# Patient Record
Sex: FEMALE | Race: WHITE
[De-identification: names, ages, dates, MRNs, and addresses within clinical notes are randomized per-mention and may not be internally consistent; named-entity substitution may affect disease eponyms.]

---

## 2018-07-24 ENCOUNTER — HOSPITAL ENCOUNTER (INPATIENT)
Dept: HOSPITAL 80 - FED | Age: 68
LOS: 9 days | Discharge: NURSING, REHAB OR LONG-TERM CARE | DRG: 71 | End: 2018-08-02
Attending: INTERNAL MEDICINE | Admitting: INTERNAL MEDICINE
Payer: COMMERCIAL

## 2018-07-24 DIAGNOSIS — I10: ICD-10-CM

## 2018-07-24 DIAGNOSIS — M25.512: ICD-10-CM

## 2018-07-24 DIAGNOSIS — R73.9: ICD-10-CM

## 2018-07-24 DIAGNOSIS — F17.200: ICD-10-CM

## 2018-07-24 DIAGNOSIS — I69.120: ICD-10-CM

## 2018-07-24 DIAGNOSIS — G93.49: Primary | ICD-10-CM

## 2018-07-24 DIAGNOSIS — Z23: ICD-10-CM

## 2018-07-24 DIAGNOSIS — I69.154: ICD-10-CM

## 2018-07-24 DIAGNOSIS — R45.1: ICD-10-CM

## 2018-07-24 DIAGNOSIS — I69.191: ICD-10-CM

## 2018-07-24 LAB — PLATELET # BLD: 396 10^3/UL (ref 150–400)

## 2018-07-24 PROCEDURE — G0480 DRUG TEST DEF 1-7 CLASSES: HCPCS

## 2018-07-24 PROCEDURE — G0009 ADMIN PNEUMOCOCCAL VACCINE: HCPCS

## 2018-07-24 NOTE — EDPHY
H & P


Stated Complaint: Chest pain, altered mental status


Time Seen by Provider: 07/24/18 19:40


HPI/ROS: 





CHIEF COMPLAINT:  Chest pain





Limitations:  Expressive aphasia, history per family and Dr. Johnson





HISTORY OF PRESENT ILLNESS:  68-year-old female status post recent hemorrhagic 

CVA presents with chest pain.  Onset of SOB and left sided cp this afternoon.  

Associated with nausea.  The patient is unable to provide any details about the 

chest pain.  She currently does not have chest pain.  Nitroglycerin given by 

EMS.  Per Dr. Johnson, pt's cognition has changed in the past 3 days.  Unclear 

etiology of change in mentation, but possibly secondary to baclofen.  She was 

on Wellbutrin prior to the CVA and then switched to Celebrex.  Celebrex was 

discontinued d/t cognitive change.  CT scan brain today showed no new changes.  

Per family, pt is normally quite sociable and agreeable.  Fairly abrupt change 

in the past 3-4 days.





REVIEW OF SYSTEMS:  Unable to determine








- Personal History


Current Tetanus Diphtheria and Acellular Pertussis (TDAP): Yes





- Medical/Surgical History


Hx Asthma: No


Hx Chronic Respiratory Disease: No


Hx Diabetes: No


Hx Cardiac Disease: No


Hx Renal Disease: No


Hx Cirrhosis: No


Hx Alcoholism: No


Hx HIV/AIDS: No


Hx Splenectomy or Spleen Trauma: No


Other PMH: cataract surgery, CVA June 2018





- Social History


Smoking Status: Former smoker


Additional Social History: 














- Physical Exam


Exam: 





General Appearance:  Alert, pleasant, expressive aphasia


Eyes:  Pupils equal and round, no conjunctival pallor or injection


ENT, Mouth:  Mucous membranes moist


Neck:  Normal inspection


Respiratory:  Lungs are clear to auscultation


Cardiovascular:  Regular rate and rhythm


Gastrointestinal:  Abdomen is soft and nontender


Neurological:  Alert, left sided hemiparesis and neglect


Skin:  Warm and dry


Extremities:  No tenderness


Psychiatric:  Tangential thought process, fluctuating affect





Constitutional: 


 Initial Vital Signs











Temperature (C)  36.9 C   07/24/18 19:43


 


Heart Rate  108 H  07/24/18 19:43


 


Respiratory Rate  20   07/24/18 19:43


 


Blood Pressure  133/85 H  07/24/18 19:43


 


O2 Sat (%)  92   07/24/18 19:43








 











O2 Delivery Mode               Nasal Cannula


 


O2 (L/minute)                  2














Allergies/Adverse Reactions: 


 





prednisone Allergy (Verified 07/24/18 19:43)


 








Home Medications: 














 Medication  Instructions  Recorded


 


Cholecalciferol Vit D3 [Vitamin D3 1,000 units PO DAILY 07/05/18





(*)]  


 


Enoxaparin [Lovenox 40 MG (*)] 40 mg SQ DAILY 07/05/18


 


Metoprolol Tartrate [Lopressor 50 50 mg PO BID 07/05/18





mg (*)]  


 


Pantoprazole Sodium [Protonix 40mg 40 mg PO DAILY 07/05/18





(*)]  


 


amLODIPine BESYLATE [Norvasc 5 mg 5 mg PO DAILY 07/05/18





(*)]  


 


Acetaminophen [Tylenol  mg 1,000 mg PO Q8 07/24/18





(*)]  


 


Baclofen [Baclofen 10 mg (*)] 5 mg PO QID 07/24/18


 


Bisacodyl [Dulcolax] 10 mg RC DAILY PRN 07/24/18


 


Cetirizine [ZyrTEC 10 mg (*)] 10 mg PO DAILY 07/24/18


 


Citalopram [CeleXA] 20 mg PO DAILY 07/24/18


 


Methyl Salicylate/Menthol [Bengay 1 chio TP Q6 PRN 07/24/18





(*)]  


 


Mineral Oil/Pet Hy-Phl [Aquaphor 1 chio TP BID 07/24/18





Ointment (*)]  


 


Sennosides [Senokot 8.6mg (OTC)] 1 each PO BID PRN 07/24/18


 


Tears/Dextran 70/Hypromellose 1 drop EACHEYE Q2 PRN 07/24/18





[Natural Balance Tears (*)]  


 


diphenhydrAMINE [Benadryl Cream] 1 chio TP QID PRN 07/24/18


 


traMADol [Ultram 50 mg (*)] 50 mg PO Q6 PRN 07/24/18


 


traZODone [traZODONE 50MG (*)] 50 mg PO HS 07/24/18














Medical Decision Making





- Diagnostics


EKG Interpretation: 





EKG interpreted by me reveals normal sinus rhythm, rate 98, no ST or T segment 

changes.  Interpretation:  Normal EKG


Imaging Results: 


 Imaging Impressions





Chest X-Ray  07/24/18 19:47


Impression: Peribronchial cuffing may be manifestation of interstitial edema or 

bronchitis.  








Chest/Thorax CTA  07/24/18 21:00


Impression:


1.  Nondiagnostic study for pulmonary embolism due to transient interruption of 

the contrast column (due to the patient talking throughout the acquisition).


2.  Normal caliber thoracic aorta.  No dissection.


3.  Clear lungs.


 


Comment:  The case was discussed with Dr. Torie Pollock.  Dr. Pollock indicated the 

patient should be transferred back to the Emergency Department for further 

assessment.


 


E:amm








CXR: NAD





ED Course/Re-evaluation: 





This pt presents with cp and SOB, asymptomatic now.  EKG reveals no evidence of 

ischemia or dysrhythmia.  Chest x-ray is unremarkable.  ddimer slightly high; 

CTA chest ordered to r/o PE, though poor study, unable to visualize pulm 

vasculature.  Will hold off on repeat CT for now, keep pt on Lovenox.  

Consulted with Dr. Johnson, plan for admission, eval of cp and AMS.  New AMS, 

unclear etiology, requests MRI while hospitalized.  The hospitalist service was 

consulted for admission.  





Differential Diagnosis: 





Differential diagnosis includes though it is not limited to pneumonia, 

pneumothorax, pulmonary embolism, aortic dissection, pericarditis, acute 

coronary syndrome.





- Data Points


Laboratory Results: 


 Laboratory Results





 07/24/18 20:16 





 07/24/18 20:16 





 











  07/24/18 07/24/18 07/24/18





  20:20 20:16 20:16


 


WBC      





    


 


RBC      





    


 


Hgb      





    


 


Hct      





    


 


MCV      





    


 


MCH      





    


 


MCHC      





    


 


RDW      





    


 


Plt Count      





    


 


MPV      





    


 


Neut % (Auto)      





    


 


Lymph % (Auto)      





    


 


Mono % (Auto)      





    


 


Eos % (Auto)      





    


 


Baso % (Auto)      





    


 


Nucleat RBC Rel Count      





    


 


Absolute Neuts (auto)      





    


 


Absolute Lymphs (auto)      





    


 


Absolute Monos (auto)      





    


 


Absolute Eos (auto)      





    


 


Absolute Basos (auto)      





    


 


Absolute Nucleated RBC      





    


 


Immature Gran %      





    


 


Immature Gran #      





    


 


D-Dimer      0.68 ug/mLFEU H ug/mLFEU





     (0.00-0.50) 


 


Sodium    136 mEq/L mEq/L  





    (135-145)  


 


Potassium    4.0 mEq/L mEq/L  





    (3.3-5.0)  


 


Chloride    103 mEq/L mEq/L  





    ()  


 


Carbon Dioxide    24 mEq/l mEq/l  





    (22-31)  


 


Anion Gap    9 mEq/L mEq/L  





    (8-16)  


 


BUN    10 mg/dL mg/dL  





    (7-23)  


 


Creatinine    0.6 mg/dL mg/dL  





    (0.6-1.0)  


 


Estimated GFR    > 60   





    


 


Glucose    250 mg/dL H mg/dL  





    ()  


 


Calcium    9.9 mg/dL mg/dL  





    (8.5-10.4)  


 


Total Bilirubin    0.2 mg/dL mg/dL  





    (0.1-1.4)  


 


Conjugated Bilirubin    0.2 mg/dL mg/dL  





    (0.0-0.5)  


 


Unconjugated Bilirubin    0.0 mg/dL mg/dL  





    (0.0-1.1)  


 


AST    42 IU/L IU/L  





    (14-46)  


 


ALT    67 IU/L H IU/L  





    (9-52)  


 


Alkaline Phosphatase    91 IU/L IU/L  





    ()  


 


POC Troponin I  0.00 ng/mL ng/mL    





   (0.00-0.08)   


 


NT-Pro-B Natriuret Pep    205 pg/mL H pg/mL  





    (0-125)  


 


Total Protein    6.2 g/dL L g/dL  





    (6.3-8.2)  


 


Albumin    3.7 g/dL g/dL  





    (3.5-5.0)  


 


Urine Color      





    


 


Urine Appearance      





    


 


Urine pH      





    


 


Ur Specific Gravity      





    


 


Urine Protein      





    


 


Urine Ketones      





    


 


Urine Blood      





    


 


Urine Nitrate      





    


 


Urine Bilirubin      





    


 


Urine Urobilinogen      





    


 


Ur Leukocyte Esterase      





    


 


Urine RBC      





    


 


Urine WBC      





    


 


Ur Epithelial Cells      





    


 


Urine Bacteria      





    


 


Urine Glucose      





    


 


Ethyl Alcohol    < 10 mg/dL mg/dL  





    (0-10)  














  07/24/18 07/24/18





  20:16 20:03


 


WBC  7.83 10^3/uL 10^3/uL  





   (3.80-9.50)  


 


RBC  5.13 10^6/uL 10^6/uL  





   (4.18-5.33)  


 


Hgb  15.2 g/dL g/dL  





   (12.6-16.3)  


 


Hct  45.2 % %  





   (38.0-47.0)  


 


MCV  88.1 fL fL  





   (81.5-99.8)  


 


MCH  29.6 pg pg  





   (27.9-34.1)  


 


MCHC  33.6 g/dL g/dL  





   (32.4-36.7)  


 


RDW  13.9 % %  





   (11.5-15.2)  


 


Plt Count  396 10^3/uL 10^3/uL  





   (150-400)  


 


MPV  10.1 fL fL  





   (8.7-11.7)  


 


Neut % (Auto)  65.5 % %  





   (39.3-74.2)  


 


Lymph % (Auto)  23.1 % %  





   (15.0-45.0)  


 


Mono % (Auto)  8.8 % %  





   (4.5-13.0)  


 


Eos % (Auto)  1.7 % %  





   (0.6-7.6)  


 


Baso % (Auto)  0.4 % %  





   (0.3-1.7)  


 


Nucleat RBC Rel Count  0.0 % %  





   (0.0-0.2)  


 


Absolute Neuts (auto)  5.13 10^3/uL 10^3/uL  





   (1.70-6.50)  


 


Absolute Lymphs (auto)  1.81 10^3/uL 10^3/uL  





   (1.00-3.00)  


 


Absolute Monos (auto)  0.69 10^3/uL 10^3/uL  





   (0.30-0.80)  


 


Absolute Eos (auto)  0.13 10^3/uL 10^3/uL  





   (0.03-0.40)  


 


Absolute Basos (auto)  0.03 10^3/uL 10^3/uL  





   (0.02-0.10)  


 


Absolute Nucleated RBC  0.00 10^3/uL 10^3/uL  





   (0-0.01)  


 


Immature Gran %  0.5 % %  





   (0.0-1.1)  


 


Immature Gran #  0.04 10^3/uL 10^3/uL  





   (0.00-0.10)  


 


D-Dimer    





   


 


Sodium    





   


 


Potassium    





   


 


Chloride    





   


 


Carbon Dioxide    





   


 


Anion Gap    





   


 


BUN    





   


 


Creatinine    





   


 


Estimated GFR    





   


 


Glucose    





   


 


Calcium    





   


 


Total Bilirubin    





   


 


Conjugated Bilirubin    





   


 


Unconjugated Bilirubin    





   


 


AST    





   


 


ALT    





   


 


Alkaline Phosphatase    





   


 


POC Troponin I    





   


 


NT-Pro-B Natriuret Pep    





   


 


Total Protein    





   


 


Albumin    





   


 


Urine Color    YELLOW 





   


 


Urine Appearance    CLEAR 





   


 


Urine pH    6.0 





    (5.0-7.5) 


 


Ur Specific Gravity    1.008 





    (1.002-1.030) 


 


Urine Protein    NEGATIVE 





    (NEGATIVE) 


 


Urine Ketones    NEGATIVE 





    (NEGATIVE) 


 


Urine Blood    NEGATIVE 





    (NEGATIVE) 


 


Urine Nitrate    NEGATIVE 





    (NEGATIVE) 


 


Urine Bilirubin    NEGATIVE 





    (NEGATIVE) 


 


Urine Urobilinogen    NEGATIVE EU EU





    (0.2-1.0) 


 


Ur Leukocyte Esterase    NEGATIVE 





    (NEGATIVE) 


 


Urine RBC    NONE SEEN /hpf /hpf





    (0-3) 


 


Urine WBC    1-3 /hpf /hpf





    (0-3) 


 


Ur Epithelial Cells    TRACE /lpf /lpf





    (NONE-1+) 


 


Urine Bacteria    TRACE /hpf H /hpf





    (NONE SEEN) 


 


Urine Glucose    3+  H 





    (NEGATIVE) 


 


Ethyl Alcohol    





   











Point of Care Test Results: 


 Chemistry











  07/24/18





  20:20


 


POC Troponin I  0.00 ng/mL ng/mL





   (0.00-0.08) 














Departure





- Departure

## 2018-07-24 NOTE — CPEKG
Heart Rate: 98

RR Interval: 612

P-R Interval: 132

QRSD Interval: 92

QT Interval: 364

QTC Interval: 465

P Axis: 46

QRS Axis: 61

T Wave Axis: 28

EKG Severity - NORMAL ECG -

EKG Impression: SINUS RHYTHM

Electronically Signed By: Adán Ma 25-Jul-2018 08:04:30

## 2018-07-24 NOTE — GHP
[f 
rep st]



                                                            HISTORY AND PHYSICAL





DATE OF ADMISSION:  07/24/2018



CHIEF COMPLAINT:  Chest pain, confusion.



HISTORY OF PRESENT ILLNESS:  This is obtained from Dr. Vinson in the ER and 
review of rehab notes, as patient is encephalopathic.



HISTORY OF PRESENT ILLNESS:  A 68-year-old female who presented to [facility] 06/18 with left upper and lower extremity weakness and was 
found to have a right frontal intraparenchymal hemorrhagic stroke.  She 
underwent craniotomy 06/19.  She was admitted to rehab at [facility] on 07/5 under 
the care of Dr. Valdez with cognitive impairment, left hemiparesis. Brought to 
ER for left-sided chest pain with shortness of breath and nausea this 
afternoon.  No radiation, numbness, or tingling. She is not good historian with 
acute AMS.



Per Dr. Fleming at rehab, her mental status has changed over the past 3 days.  
She is more confused and not participating in therapies. CTH today was stable, 
but MRI not complete, because of agitation. Of note, Baclofen had been 
increased to 15mg QID yesterday. Was on Wellbutrin before stroke and this was 
changed to Celexa at rehab. Both Baclofen and Celexa stopped today.



Upon my interview, patient says the ceiling is spinning.  She is talking to her 
 Rodney, who is not in the room. 





REVIEW OF SYSTEMS:  I completed a 10-point review of systems per chart review.



PAST MEDICAL HISTORY:  Hypertension, cataracts, right frontal intraparenchymal 
hemorrhagic stroke, depression.



PAST SURGICAL HISTORY:  Cataract surgery, craniotomy 06/19/18.



SOCIAL HISTORY:  Tobacco dependence.  No alcohol.



FAMILY HISTORY:  Noncontributory.



CURRENT MEDICATIONS:  Tylenol, Zofran, Norvasc 5 mg daily, Zyrtec, vitamin D, 
Celexa 20 mg stopped today, baclofen 15 mg q.i.d. scheduled, stopped today, 
Lovenox, metoprolol 50 mg b.i.d., Protonix 40 daily, senna, tramadol 50 mg q.6 
hours p.r.n., trazodone 50 mg q.h.s.,  



ALLERGIES:  Prednisone.



PHYSICAL EXAMINATION:  VITAL SIGNS:  Temperature is 135/80.  Heart rate is in 
100s, respiration 19, 96% on 2 L.  Temperature is 36.9.  GENERAL:  Lying in bed
, no acute distress, but mildly restless.  HEENT:  PERRLA.  Moist mucous 
membranes. CV:  Regular, tachy.  Reproducible chest pain, left side.  There is 
no rash or ulceration over chest or breasts.  LUNGS:  Clear anteriorly.  GI:  
Soft, nontender, nondistended.  Positive bowel sounds.  :  No Lundy.  
MUSCULOSKELETAL:  5/5 upper extremity strength, right.  Persistent left 
hemiparesis, upper and lower extremities.  NEURO:  2 through 12 intact.  PSYCH:
  She is agitated.  She is talking to her , who is not in the room.  She 
is oriented to our president, knows she is in the hospital but not in [facility], 
does not know the year.



LABS:  WBC 7, hemoglobin 15, hematocrit 45, platelets 396.  Dimer is 0.68.  
Sodium 136, potassium 4.0, chloride 103, carbon dioxide 24, creatinine 0.6, 
glucose 250.  LFT:  Total bili 0.2, AST 42, ALT 67.  Troponin 0.00.  BNP is 
205.  Total protein 6.2, albumin is 3.7.  UA:  Trace bacteria, 1-3 whites.  BAL 
is negative.  



EKG is personally reviewed by me, normal sinus rhythm, artifact.



ASSESSMENT AND PLAN:  

1.  Left-sided chest pain:  not able to endorse a full history.  There is 
reproducible component on my exam; may be radiation from shoulder pain. Had 
negative xray 7/16.   Initial troponin, EKG negative for ischemia.  Repeat 
troponin.  TTE  in June at [facility] with an EF 75% and left ventricular 
hypertrophy.  Dimer was minimally elevated; CTA ordered in ER.

2.  Acute-on-chronic encephalopathy:  underlying cognitive impairment related 
to her stroke, but this has worsened over the last 3 days.  Differential is 
infection, new stroke, or medications.  CT head today was stable. Needs MRI; 
likely with sedation.  Baclofen and Celexa were new medications for her at rehab
, and these have been discontinued.  UA is normal, CXR negative.

3.  Recent intraparenchymal hemorrhage and evacuation:  CT stable today.  She 
has been in inpatient rehab.  She has profound left upper and lower extremity 
hemiparesis.  Continue physical therapy, occupational therapy.  She will likely 
need discharge to a skilled nursing facility given lack of caregivers at home.

4.  Dysphagia:  Related to stroke.  Continue speech therapy.  Continue modified 
diet.  

5.  Recent left shoulder pain:  limited range of motion.  She had a normal x-
ray 07/16 and does have a history of rotator cuff injury.  She has a sling to 
offload. PRN APAP. 

6.  Hypertension:  Amlodipine and metoprolol.

7.  Tobacco dependence:  Previously on Wellbutrin, can consider a nicotine 
patch.

8.  Diet:  Speech eval in the morning.



DISPOSITION:  Patient warrants inpatient admission for evaluation of chest pain
, repeat MRI, and further evaluation of acute encephalopathy.





Job #:  [id number]

MTDD

## 2018-07-25 RX ADMIN — BACLOFEN SCH MG: 10 TABLET ORAL at 21:19

## 2018-07-25 RX ADMIN — METOPROLOL TARTRATE SCH MG: 50 TABLET, FILM COATED ORAL at 21:19

## 2018-07-25 RX ADMIN — ENOXAPARIN SODIUM SCH MG: 100 INJECTION SUBCUTANEOUS at 13:49

## 2018-07-25 RX ADMIN — BACLOFEN SCH MG: 10 TABLET ORAL at 13:24

## 2018-07-25 RX ADMIN — BACLOFEN SCH MG: 10 TABLET ORAL at 16:30

## 2018-07-25 RX ADMIN — ACETAMINOPHEN PRN MG: 325 TABLET ORAL at 12:37

## 2018-07-25 RX ADMIN — METOPROLOL TARTRATE SCH MG: 50 TABLET, FILM COATED ORAL at 09:19

## 2018-07-25 RX ADMIN — Medication SCH APP: at 21:19

## 2018-07-25 RX ADMIN — Medication SCH: at 09:22

## 2018-07-25 RX ADMIN — METOPROLOL TARTRATE SCH MG: 50 TABLET, FILM COATED ORAL at 02:01

## 2018-07-25 RX ADMIN — VITAMIN D, TAB 1000IU (100/BT) SCH UNITS: 25 TAB at 09:19

## 2018-07-25 RX ADMIN — PANTOPRAZOLE SODIUM SCH MG: 40 TABLET, DELAYED RELEASE ORAL at 09:19

## 2018-07-25 NOTE — HOSPPROG
Hospitalist Progress Note


Assessment/Plan: 





Chest pain - EKG non-ischemic, trop neg x2.  Doubt PE as no pleuritic symptoms, 

tachycardia or hypoxia.  D dimer essentially negative when age adjusted 

criteria applied, CTA non-diagnostic.  Will check LE us (neg).  With burning 

sensation she describes, will try GI cocktail, though I suspect this is MSK in 

origin related to her spasticity of LUE.  Resume lower dose of Baclofen and 

monitor.  PT/OT evals.





Encephalopathy - CT and MRI do not suggest new stroke or hemorrhage.  Mentation 

seems improved today.  Could have been from rapid up-titration of Baclofen from 

5 QID to 15 QID.  She was previously doing well on lower dose and she wishes to 

resume this.  Will restart Baclofen at lower dose of 5 mg TID.  SSRI also held





ICH - recent craniotomy at [facility] in 6/2018.  Dense left hemiparesis.  She has not 

progressed after 1 month of inpt rehab.  Cont PT/OT, speech





Dysphagia - speech





Hypertension - a bit labile, but fairly well controlled on Metoprolol and 

Amlodipine.  Will continue at current doses.





DVT PPLX - high risk, start Lovenox





Dispo - inpt, will need ongoing hospitalization for further evaluation and 

acute PT/OT.  Per rehab notes, she has not made progress there and SNF is 

recommended.  CM following.








Subjective: Pt doing ok.  She still has "burning" sensation on her left chest 

wall, seems positional.  No fevers/chills or cough.  No SOB.


Objective: 


 Vital Signs











Temp Pulse Resp BP Pulse Ox


 


 36.9 C   89   18   155/95 H  91 L


 


 07/25/18 11:45  07/25/18 11:45  07/25/18 11:45  07/25/18 11:45  07/25/18 11:45








 











 07/24/18 07/25/18 07/26/18





 05:59 05:59 05:59


 


Intake Total  550 500


 


Output Total  150 


 


Balance  400 500














- Physical Exam


Constitutional: no apparent distress


Eyes: PERRL


Ears, Nose, Mouth, Throat: moist mucous membranes


Cardiovascular: regular rate and rhythym


Respiratory: no respiratory distress, clear to auscultation


Gastrointestinal: normoactive bowel sounds, soft, non-tender abdomen


Skin: warm


Musculoskeletal: other (dense left hemiparesis)


Neurologic: AAOx3


Psychiatric: interacting appropriately





ICD10 Worksheet


Patient Problems: 


 Problems











Problem Status Onset


 


Chest pain Acute  


 


Altered mental status Acute  


 


Hemorrhagic cerebrovascular accident (CVA) Acute

## 2018-07-25 NOTE — PDMN
Medical Necessity


Medical necessity: Pt meets IP criteria per MD; est los >2 mn for eval/tx of 

worsening encephalopathy, chest pain, shortness of breath & nausea s/p recent 

craniotomy; requiring further workup/monitoring & therapies; hx CVA, dysphagia, 

upper & lower extremity hemiparesis & HTN; per H&P & order 7/24/18

## 2018-07-25 NOTE — ASMTCMCOM
CM Note

 

CM Note                       

Notes:

CM spoke w/ pts family regarding d/c POC per their request. PT is recommending inpatient rehab. CM 

spoke to [place] from inpatient rehab and they had a team discussion today and thought that it 

would not be appropriate for pt to return. Lima reports that pt is unable to do 3 hours of 

therapies a day. The recommendation from inpatient rehab is for pt to transition to a SNF. CM spoke 


to Dr. Cushing. Dr. Cushing would like pt to get into a stroke rehab program. Referral sent to 

inpatient rehab facilities. Family would like another referral made to [facility] in [place]. CM to 

follow.







Plan: TBD

 

Date Signed:  07/25/2018 02:52 PM

Electronically Signed By:TORITO Smith

## 2018-07-25 NOTE — ASMTCASEMG
Living Arrangements

 

What is your living           Answers:  With Spouse                           

arrangement? Who do you                                                       

live with?                                                                    

Type Of Residence

 

What kind of residence do     Answers:  House                                 

you live in?                                                                  

Discharge Plan Comments

 

Coordination Status           

Comments                      

Notes:

Pt is a 69 y/o female admitted for ams, recent cva and chest pain. Pt was recently at [facility] inpatient 


rehab from 7/5/18 to 7/24/18. Therapies have been ordered and awaiting recommendations. Pt will 

most likely d/c back to [facility] inpatient rehab, if appropriate. CM spoke to pts son Aroldo 

(P# [phone]) and provided updates. CM to follow.







Plan: TBD

 

Date Signed:  07/25/2018 11:55 AM

Electronically Signed By:TORITO Smith

## 2018-07-26 RX ADMIN — PANTOPRAZOLE SODIUM SCH MG: 40 TABLET, DELAYED RELEASE ORAL at 09:37

## 2018-07-26 RX ADMIN — INSULIN LISPRO SCH: 100 INJECTION, SOLUTION INTRAVENOUS; SUBCUTANEOUS at 13:32

## 2018-07-26 RX ADMIN — BACLOFEN SCH MG: 10 TABLET ORAL at 15:14

## 2018-07-26 RX ADMIN — Medication SCH APP: at 09:38

## 2018-07-26 RX ADMIN — INSULIN LISPRO SCH: 100 INJECTION, SOLUTION INTRAVENOUS; SUBCUTANEOUS at 09:35

## 2018-07-26 RX ADMIN — METOPROLOL TARTRATE SCH MG: 50 TABLET, FILM COATED ORAL at 20:17

## 2018-07-26 RX ADMIN — Medication SCH APP: at 20:18

## 2018-07-26 RX ADMIN — INSULIN LISPRO SCH: 100 INJECTION, SOLUTION INTRAVENOUS; SUBCUTANEOUS at 17:35

## 2018-07-26 RX ADMIN — METOPROLOL TARTRATE SCH MG: 50 TABLET, FILM COATED ORAL at 09:37

## 2018-07-26 RX ADMIN — VITAMIN D, TAB 1000IU (100/BT) SCH UNITS: 25 TAB at 09:37

## 2018-07-26 RX ADMIN — ACETAMINOPHEN PRN MG: 325 TABLET ORAL at 17:48

## 2018-07-26 RX ADMIN — ENOXAPARIN SODIUM SCH MG: 100 INJECTION SUBCUTANEOUS at 09:37

## 2018-07-26 RX ADMIN — ACETAMINOPHEN PRN MG: 325 TABLET ORAL at 04:27

## 2018-07-26 RX ADMIN — BACLOFEN SCH MG: 10 TABLET ORAL at 21:48

## 2018-07-26 RX ADMIN — BACLOFEN SCH MG: 10 TABLET ORAL at 09:37

## 2018-07-26 NOTE — HOSPPROG
Hospitalist Progress Note


Assessment/Plan: 





Chest pain - EKG non-ischemic, trop neg x2.  Doubt PE as no pleuritic symptoms, 

tachycardia or hypoxia.  D dimer essentially negative when age adjusted 

criteria applied, CTA non-diagnostic.  LE u/s neg for DVT.  Symptoms improved 

with resumption of lower dose of Baclofen. 





Encephalopathy - CT and MRI do not suggest new stroke or hemorrhage.  Mentation 

seems improved today, but still intermittently confused and at times 

hallucinatory.  Rapid up-titration of Baclofen from 5 QID to 15 QID may have 

been contributory.  Cont lower dose of Baclofen, avoid up-titration for now.  

Trial low dose seroquel at bedtime to address hallucinations and sleep 

disturbance.  Discussed black box warning with pt and family.





ICH - recent craniotomy at [facility] in 6/2018.  Dense left hemiparesis.  She has not 

progressed after 1 month of inpt rehab.  Cont PT/OT, speech.  Neurology to 

consult in am regarding prognosis.





Hyperglycemia - SSI, check a1c.  Watch bg's with addition of low dose seroquel.





Dysphagia - speech recommending inpt rehab, though she has completed several 

weeks with no progress per their dc summary.





Hypertension - a bit labile, but fairly well controlled on Metoprolol and 

Amlodipine.  Will continue at current doses.





DVT PPLX - high risk, Lovenox





Dispo - inpt, will need ongoing hospitalization for further evaluation and 

acute PT/OT.  Per rehab notes, she has not made progress there and SNF is 

recommended.  CM following.








Subjective: Pt is doing a little better today.  Pain is improved.  She is 

intermittently confused.  Has had some hallucinations.  Not sleeping well.  No 

CP or SOB.


Objective: 


 Vital Signs











Temp Pulse Resp BP Pulse Ox


 


 36.6 C   82   18   122/77 H  90 L


 


 07/26/18 07:38  07/26/18 07:38  07/26/18 07:38  07/26/18 07:38  07/26/18 07:38








 











 07/25/18 07/26/18 07/27/18





 05:59 05:59 05:59


 


Intake Total 550 2500 


 


Output Total 150  


 


Balance 400 2500 














- Physical Exam


Constitutional: no apparent distress


Eyes: PERRL


Ears, Nose, Mouth, Throat: moist mucous membranes


Cardiovascular: regular rate and rhythym


Respiratory: no respiratory distress


Gastrointestinal: normoactive bowel sounds, soft, non-tender abdomen


Skin: warm


Musculoskeletal: other (left hemiplegia with left sided neglect and left 

hemianopsia)


Neurologic: AAOx3


Psychiatric: interacting appropriately





ICD10 Worksheet


Patient Problems: 


 Problems











Problem Status Onset


 


Altered mental status Acute  


 


Chest pain Acute  


 


Hemorrhagic cerebrovascular accident (CVA) Acute

## 2018-07-26 NOTE — ASMTCMCOM
CM Note

 

CM Note                       

Notes:

CM spoke to ShiraJosephine and Sakina regarding d/c POC. CM discussed w/ them the different levels of 

care. Shira and Josephine had questions about insurance coverage. CM provided them w/ phone number for 

financial counseling. CM contacted Cat [facility]ai w/ financial counseling and pt has Medicare part A 

and B coverage. Cat will follow up with patient on a secondary insurance. Julia from [employer] came 

and met w/ them today. [facility] has accepted pt. [facility] denied pt. CM left a msg for 

Daxa, the  from inpatient rehab. CM to follow.







Plan: [facility] SNF

 

Date Signed:  07/26/2018 03:25 PM

Electronically Signed By:TORITO Smith

## 2018-07-27 RX ADMIN — VITAMIN D, TAB 1000IU (100/BT) SCH UNITS: 25 TAB at 09:46

## 2018-07-27 RX ADMIN — INSULIN LISPRO SCH: 100 INJECTION, SOLUTION INTRAVENOUS; SUBCUTANEOUS at 18:07

## 2018-07-27 RX ADMIN — ACETAMINOPHEN PRN MG: 325 TABLET ORAL at 09:47

## 2018-07-27 RX ADMIN — ACETAMINOPHEN PRN MG: 325 TABLET ORAL at 00:32

## 2018-07-27 RX ADMIN — ACETAMINOPHEN PRN MG: 325 TABLET ORAL at 15:41

## 2018-07-27 RX ADMIN — BACLOFEN SCH MG: 10 TABLET ORAL at 15:41

## 2018-07-27 RX ADMIN — METOPROLOL TARTRATE SCH MG: 50 TABLET, FILM COATED ORAL at 21:01

## 2018-07-27 RX ADMIN — BACLOFEN SCH MG: 10 TABLET ORAL at 09:46

## 2018-07-27 RX ADMIN — Medication SCH MG: at 21:01

## 2018-07-27 RX ADMIN — PANTOPRAZOLE SODIUM SCH MG: 40 TABLET, DELAYED RELEASE ORAL at 09:47

## 2018-07-27 RX ADMIN — BACLOFEN SCH MG: 10 TABLET ORAL at 20:59

## 2018-07-27 RX ADMIN — INSULIN LISPRO SCH: 100 INJECTION, SOLUTION INTRAVENOUS; SUBCUTANEOUS at 14:45

## 2018-07-27 RX ADMIN — Medication SCH APP: at 21:08

## 2018-07-27 RX ADMIN — METOPROLOL TARTRATE SCH MG: 50 TABLET, FILM COATED ORAL at 09:47

## 2018-07-27 RX ADMIN — ENOXAPARIN SODIUM SCH MG: 100 INJECTION SUBCUTANEOUS at 09:46

## 2018-07-27 RX ADMIN — ANALGESIC BALM PRN APP: 1.74; 4.06 OINTMENT TOPICAL at 21:01

## 2018-07-27 RX ADMIN — INSULIN LISPRO SCH: 100 INJECTION, SOLUTION INTRAVENOUS; SUBCUTANEOUS at 08:47

## 2018-07-27 RX ADMIN — Medication SCH APP: at 09:47

## 2018-07-27 NOTE — ASMTCMCOM
CM Note

 

CM Note                       

Notes:

Pts case discussed in tx rounds. CM met w/ pts family to discuss d/c plans. Pts family went over to 


[facility] for a care conference. Family has significant concerns about pts safety and potential for 

falling out of bed and chair. Pt has been off a roll belt and sitter since yesterday. 







 CM spoke to Marely charge nurse,  Dr. Cushing and Nasreen manager of  about this 

case. Nasreen will call Sakina, the niece. Potential care conference on Monday 

w/ MD, neurology, RN, and Yudelka Dumont from [facility] will need to speak to her DON and  to discuss 

if they can still accept pt. [facility] is recommending pt has 24hr supervision until pts confusion 

clears. CM suggested that family takes turn w/ keeping pt company. CM provided family w/ list of 

non skilled HC for a private duty caregiver. CM notified [employer] that pt would benefit from a 

Medicaid evaluation for HCBS services. Cat Ming from financial counseling met w/ pt today. Cat 

will be inquiring about a secondary insurance. Pt reported to Cat that she has [employer] as her 

secondary. CM provided Cat w/ phone number for Sakina to follow up. Pts son from [place] will be 

here on Sunday. CM spoke to Daxa  at inpatient rehab. CM to follow.



 



Plan: TBD

 

Date Signed:  07/27/2018 03:22 PM

Electronically Signed By:TORITO Smith

## 2018-07-27 NOTE — HOSPPROG
Hospitalist Progress Note


Assessment/Plan: 





Chest pain - Suspect this was related to spasticity.  Symptoms improved with 

resumption of lower dose of Baclofen.  EKG non-ischemic, trop neg x2.  Doubt PE 

as no pleuritic symptoms, tachycardia or hypoxia.  D dimer essentially negative 

when age adjusted criteria applied, CTA non-diagnostic.  LE u/s neg for DVT.  





Encephalopathy - CT and MRI do not suggest new stroke or hemorrhage.  Mentation 

seems improved today, but still intermittently confused and at times.  

Inattentiveness likely related to CVA.  In addition, rapid up-titration of 

Baclofen from 5 QID to 15 QID may have been contributory.  Cont lower dose of 

Baclofen, avoid up-titration for now.  Trial low dose seroquel at bedtime to 

address hallucinations and sleep disturbance.  Discussed black box warning with 

pt and family.  





ICH - recent craniotomy at [facility] in 6/2018.  Dense left hemiparesis with 

spasticity and hemineglect.  She has not progressed after 1 month of inpt 

rehab.  Cont PT/OT, speech.  Discussed with neurology.  Should have outpt 

neurology appt for consideration of botox injection to treat spasticity 

component. 





Hyperglycemia - Cont SSI.  Watch bg's with addition of low dose seroquel.





Dysphagia - speech recommending inpt rehab, though she has completed several 

weeks with no progress per their dc summary.





Hypertension - fairly well controlled on Metoprolol and Amlodipine.  Continue 

at current doses.





DVT PPLX - high risk, Lovenox





Dispo - cont inpt.  She has plateaud at inpt rehab and plan is for dc to SNF.  

However, safety concerns are arising as SNF's cannot accommodate guard rails 

and roll belts, which pt occasionally needs for safety.  CM involved.  Not a 

candidate for LTAC.  May need to go to SNF with safety plan including lowering 

bed, possibly hip pads, etc.  Also, option for family to hire 24 hr sitter.  








Subjective: Pt doing ok today, seems to be back to her baseline.  Denies CP or 

SOB.  Mentation is ok, not hallucinatory this am.


Objective: 


 Vital Signs











Temp Pulse Resp BP Pulse Ox


 


 36.9 C   77   18   124/74 H  95 


 


 07/27/18 07:51  07/27/18 07:51  07/27/18 07:51  07/27/18 07:51  07/27/18 07:51








 











 07/26/18 07/27/18 07/28/18





 05:59 05:59 05:59


 


Intake Total 2500 1200 


 


Output Total  700 200


 


Balance 2500 500 -200














- Physical Exam


Constitutional: no apparent distress


Eyes: PERRL


Ears, Nose, Mouth, Throat: moist mucous membranes


Cardiovascular: regular rate and rhythym


Respiratory: no respiratory distress


Gastrointestinal: normoactive bowel sounds, soft, non-tender abdomen


Skin: warm


Musculoskeletal: other (dense left hemiparesis with flexion of LUE and 

hemineglect, also left hemianopsia)


Neurologic: AAOx3


Psychiatric: interacting appropriately





ICD10 Worksheet


Patient Problems: 


 Problems











Problem Status Onset


 


Altered mental status Acute  


 


Chest pain Acute  


 


Hemorrhagic cerebrovascular accident (CVA) Acute

## 2018-07-27 NOTE — ASMTCMCOM
CM Note

 

CM Note                       

Notes:

I spoke with patient's niece at length this afternoon.  After meeting with [facility] in [place] 

today, family is now leaning towards choosing a facility that will accommodate LTC - as they 

believe she will eventually need to 'live' somewhere.  I recommended both [facility] and 


[facility], law states they will check into both facilities.  Law has also been in touch with a 


Medicaid specialist about getting her aunt on Medicaid.  CM has also sent a referral to 

[facility], they will follow-up and screen for Medicaid on Monday.  The family is also exploring ways 

in which they can either help provided 1:1 care at the SNF or hire private care, (they are 

considering a Go Fund Me page) as they are quite concerned about patient's safety, rolling out of 

bed, etc.



I explained that patient is medically ready for d/c and encouraged family to tour facilities and 

follow-up with CM asap.  Law states patient's other son will be flying into town this weekend and 


will be driving this plan.  Referrals have yet to be sent to the two facilities above, will wait to 


speak with family about choice.  A care conference may be necessary on Monday, pending events this 

weekend.  We will also need to notify [facility] in [place] of this potential change in plans.  Law 

has my # and can reach out to me at any time with questions.  CM will follow-up this weekend.



Plan:  SNF

 

 

Date Signed:  07/27/2018 08:34 PM

Electronically Signed By:Nasreen Jenkins RN

## 2018-07-28 RX ADMIN — ENOXAPARIN SODIUM SCH MG: 100 INJECTION SUBCUTANEOUS at 08:22

## 2018-07-28 RX ADMIN — INSULIN LISPRO SCH: 100 INJECTION, SOLUTION INTRAVENOUS; SUBCUTANEOUS at 09:09

## 2018-07-28 RX ADMIN — Medication SCH APP: at 08:22

## 2018-07-28 RX ADMIN — BACLOFEN SCH MG: 10 TABLET ORAL at 08:22

## 2018-07-28 RX ADMIN — METOPROLOL TARTRATE SCH MG: 50 TABLET, FILM COATED ORAL at 22:38

## 2018-07-28 RX ADMIN — INSULIN LISPRO SCH: 100 INJECTION, SOLUTION INTRAVENOUS; SUBCUTANEOUS at 11:34

## 2018-07-28 RX ADMIN — METOPROLOL TARTRATE SCH MG: 50 TABLET, FILM COATED ORAL at 08:22

## 2018-07-28 RX ADMIN — Medication SCH MG: at 22:39

## 2018-07-28 RX ADMIN — VITAMIN D, TAB 1000IU (100/BT) SCH UNITS: 25 TAB at 08:22

## 2018-07-28 RX ADMIN — Medication SCH APP: at 22:47

## 2018-07-28 RX ADMIN — ANALGESIC BALM PRN APP: 1.74; 4.06 OINTMENT TOPICAL at 13:52

## 2018-07-28 RX ADMIN — PANTOPRAZOLE SODIUM SCH MG: 40 TABLET, DELAYED RELEASE ORAL at 08:22

## 2018-07-28 RX ADMIN — BACLOFEN SCH MG: 10 TABLET ORAL at 22:40

## 2018-07-28 RX ADMIN — INSULIN LISPRO SCH: 100 INJECTION, SOLUTION INTRAVENOUS; SUBCUTANEOUS at 16:10

## 2018-07-28 RX ADMIN — BACLOFEN SCH MG: 10 TABLET ORAL at 15:50

## 2018-07-28 RX ADMIN — ACETAMINOPHEN PRN MG: 325 TABLET ORAL at 06:29

## 2018-07-28 NOTE — ASMTCMCOM
CM Note

 

CM Note                       

Notes:

CM met with Pt and family.  Multiple questions re insurance and what services can be paid for.  CM 

recommended that they have a family mtg with physician on Monday with the focus on what services 

might be recommended once Pt is [facility] and ready for D/C.  There are many family members present with 

many different thoughts and ideas, with one of the family members becoming upset easily; the 

environment can become quite chaotic and appears to distress Pt.  Family was told family mtg should 


be limited to Pt and 1-2 people so that the environment is more conducive to decision 

making.  [facility] has accepted Pt.  Niece reported [facility] indicated Pt was not making enough 

progress to return.  CM left a message for Lima in order to rule out [facility] as a discharge 

option.  CM to follow.

 

D/C:  TBD

 

 

Date Signed:  07/28/2018 01:17 PM

Electronically Signed By:Inez Portillo

## 2018-07-28 NOTE — HOSPPROG
Hospitalist Progress Note


Assessment/Plan: 


Ms Hendrix is a 67yo F with history of recent R parietal intracranial hemorrhage 

resulting in L sided paresis and L hemineglect who presents from [place] [facility] 

due to altered mental status. 





Encephalopathy: Seems to be improving. CT and MRI brain without new process. 

Suspect component of delirium related to CVA given waxing/waning in addition to 

meds (baclofen). Will continue lower dose baclofen, seroquel at bedtime. Adding 

hydroxyzine to help with mild agitation. 





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 Dense left hemiparesis 

with spasticity and hemineglect.  She has not progressed after 1 month of inpt 

rehab.  Cont PT/OT, speech.  Discussed with neurology.  Should have outpt 

neurology appt for consideration of botox injection to treat spasticity 

component. 





HTN: BP controlled. Continue current regimen of amlodipine and metoprolol.





Hyperglycemia: Cont SSI. Watch bg's with addition of low dose seroquel.





Dysphagia: Speech recommending inpt rehab, though she has completed several 

weeks with no progress per their dc summary.





VTE ppx: high risk, LMWH


Diet: regular


Code: FCFT


Disposition: Remain inpatient while mental status clearing and while planning 

for safe discharge. Unclear if going to SNF (family has concern re: safety at 

this facility) vs long-term care. CM involved. 





Subjective: Having some right knee pain and left shoulder "nerve pain." 

Otherwise seems to be doing well. Her family is concerned about agitation.


Objective: 


 Vital Signs











Temp Pulse Resp BP Pulse Ox


 


 36.8 C   93   18   122/76 H  92 


 


 07/28/18 16:00  07/28/18 16:00  07/28/18 16:00  07/28/18 16:00  07/28/18 16:00








 











 07/27/18 07/28/18 07/29/18





 05:59 05:59 05:59


 


Intake Total 1200 1050 250


 


Output Total 700 400 


 


Balance 500 650 250














- Physical Exam


Constitutional: no apparent distress, appears nourished, not in pain


Eyes: PERRL, anicteric sclera, EOMI


Ears, Nose, Mouth, Throat: moist mucous membranes, hearing normal, ears appear 

normal, no oral mucosal ulcers


Cardiovascular: regular rate and rhythym, no murmur, rub, or gallop


Respiratory: no respiratory distress, no rales or rhonchi, clear to auscultation


Gastrointestinal: normoactive bowel sounds, soft, non-tender abdomen, no 

palpable masses


Skin: no rashes or abrasions, no fluctuance, no induration


Neurologic: other (intermittently confused, R sided upper and lower extremity 

spasticity, left hemineglect)





ICD10 Worksheet


Patient Problems: 


 Problems











Problem Status Onset


 


Altered mental status Acute  


 


Chest pain Acute  


 


Hemorrhagic cerebrovascular accident (CVA) Acute

## 2018-07-29 RX ADMIN — Medication SCH APP: at 10:24

## 2018-07-29 RX ADMIN — BACLOFEN SCH MG: 10 TABLET ORAL at 21:34

## 2018-07-29 RX ADMIN — METOPROLOL TARTRATE SCH MG: 50 TABLET, FILM COATED ORAL at 21:35

## 2018-07-29 RX ADMIN — Medication SCH MG: at 21:35

## 2018-07-29 RX ADMIN — INSULIN LISPRO SCH: 100 INJECTION, SOLUTION INTRAVENOUS; SUBCUTANEOUS at 17:40

## 2018-07-29 RX ADMIN — Medication SCH APP: at 21:45

## 2018-07-29 RX ADMIN — VITAMIN D, TAB 1000IU (100/BT) SCH UNITS: 25 TAB at 10:24

## 2018-07-29 RX ADMIN — ACETAMINOPHEN PRN MG: 325 TABLET ORAL at 21:33

## 2018-07-29 RX ADMIN — BACLOFEN SCH MG: 10 TABLET ORAL at 16:17

## 2018-07-29 RX ADMIN — PANTOPRAZOLE SODIUM SCH MG: 40 TABLET, DELAYED RELEASE ORAL at 10:24

## 2018-07-29 RX ADMIN — ENOXAPARIN SODIUM SCH MG: 100 INJECTION SUBCUTANEOUS at 10:22

## 2018-07-29 RX ADMIN — INSULIN LISPRO SCH: 100 INJECTION, SOLUTION INTRAVENOUS; SUBCUTANEOUS at 16:08

## 2018-07-29 RX ADMIN — METOPROLOL TARTRATE SCH MG: 50 TABLET, FILM COATED ORAL at 10:24

## 2018-07-29 RX ADMIN — INSULIN LISPRO SCH: 100 INJECTION, SOLUTION INTRAVENOUS; SUBCUTANEOUS at 09:36

## 2018-07-29 RX ADMIN — BACLOFEN SCH MG: 10 TABLET ORAL at 10:23

## 2018-07-29 NOTE — ASMTCMCOM
CM Note

 

CM Note                       

Notes:

Met with son from [place] who expressed frustration and concern over plan of care for his 

mother. He would like to participate in a family meeting to ascertain plan of care from this point 

forward. He would be available in the morning about 10 am. I have attempted to see if Dr. Powers 

might be able to make this time. CM to follow.

 

Date Signed:  07/29/2018 04:11 PM

Electronically Signed By:Willa Garcia RN

## 2018-07-29 NOTE — HOSPPROG
Hospitalist Progress Note


Assessment/Plan: 


Ms Hendrix is a 69yo F with history of recent R parietal intracranial hemorrhage 

resulting in L sided paresis and L hemineglect who presents from [place] [facility] 

due to altered mental status. 





Encephalopathy: Improving and likely at new baseline. CT and MRI brain without 

new process. Suspect component of delirium related to CVA given waxing/waning 

in addition to meds (baclofen). Will continue lower dose baclofen, seroquel at 

bedtime, hydroxyzine PRN to help with mild agitation. 





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 Dense left hemiparesis 

with spasticity and hemineglect.  She has not progressed after 1 month of inpt 

rehab.  Cont PT/OT, speech.  Discussed with neurology.  Should have outpt 

neurology appt for consideration of botox injection to treat spasticity 

component. 





HTN: BP controlled. Continue current regimen of amlodipine and metoprolol.





Hyperglycemia: Cont SSI. Watch bg's with addition of low dose seroquel.





Dysphagia: Speech recommending inpt rehab, though she has completed several 

weeks with no progress per their dc summary.





VTE ppx: high risk, LMWH


Diet: regular


Code: FCFT


Disposition: Remain inpatient while mental status clearing and while planning 

for safe discharge. Per CM, not LTAC candidate. Plan for family meeting 

tomorrow to discuss option of SNF. Family concerned about SNF due to safety and 

she may need a safety plan at SNF vs family hiring sitter.  





Subjective: No issues this morning. Denies pain, shortness of breath, fevers.


Objective: 


 Vital Signs











Temp Pulse Resp BP Pulse Ox


 


 36.5 C   73   16   143/84 H  91 L


 


 07/29/18 12:04  07/29/18 12:04  07/29/18 12:04  07/29/18 12:04  07/29/18 12:04








 











 07/28/18 07/29/18 07/30/18





 05:59 05:59 05:59


 


Intake Total 1050 650 


 


Output Total 400  


 


Balance 650 650 














- Physical Exam


Constitutional: no apparent distress, appears nourished, not in pain


Eyes: PERRL, anicteric sclera, EOMI


Ears, Nose, Mouth, Throat: moist mucous membranes, hearing normal, ears appear 

normal, no oral mucosal ulcers


Cardiovascular: regular rate and rhythym, no murmur, rub, or gallop


Respiratory: no respiratory distress, no rales or rhonchi, clear to auscultation


Gastrointestinal: normoactive bowel sounds, soft, non-tender abdomen, no 

palpable masses


Skin: no rashes or abrasions, no fluctuance, no induration


Neurologic: other (Alert, not fully oriented with tangential thoughts, Left 

sided paresis, Left hemineglect)


Psychiatric: interacting appropriately





ICD10 Worksheet


Patient Problems: 


 Problems











Problem Status Onset


 


Altered mental status Acute  


 


Chest pain Acute  


 


Hemorrhagic cerebrovascular accident (CVA) Acute

## 2018-07-30 RX ADMIN — ANALGESIC BALM PRN APP: 1.74; 4.06 OINTMENT TOPICAL at 10:07

## 2018-07-30 RX ADMIN — Medication SCH APP: at 08:16

## 2018-07-30 RX ADMIN — INSULIN LISPRO SCH: 100 INJECTION, SOLUTION INTRAVENOUS; SUBCUTANEOUS at 08:17

## 2018-07-30 RX ADMIN — BACLOFEN SCH MG: 10 TABLET ORAL at 08:04

## 2018-07-30 RX ADMIN — ENOXAPARIN SODIUM SCH MG: 100 INJECTION SUBCUTANEOUS at 08:47

## 2018-07-30 RX ADMIN — PANTOPRAZOLE SODIUM SCH MG: 40 TABLET, DELAYED RELEASE ORAL at 08:03

## 2018-07-30 RX ADMIN — ACETAMINOPHEN PRN MG: 325 TABLET ORAL at 15:18

## 2018-07-30 RX ADMIN — BACLOFEN SCH MG: 10 TABLET ORAL at 21:54

## 2018-07-30 RX ADMIN — ACETAMINOPHEN PRN MG: 325 TABLET ORAL at 07:07

## 2018-07-30 RX ADMIN — Medication SCH: at 23:15

## 2018-07-30 RX ADMIN — BACLOFEN SCH MG: 10 TABLET ORAL at 15:18

## 2018-07-30 RX ADMIN — METOPROLOL TARTRATE SCH MG: 50 TABLET, FILM COATED ORAL at 21:53

## 2018-07-30 RX ADMIN — VITAMIN D, TAB 1000IU (100/BT) SCH UNITS: 25 TAB at 08:03

## 2018-07-30 RX ADMIN — METOPROLOL TARTRATE SCH MG: 50 TABLET, FILM COATED ORAL at 08:03

## 2018-07-30 RX ADMIN — Medication SCH MG: at 21:53

## 2018-07-30 NOTE — ASMTCMCOM
CM Note

 

CM Note                       

Notes:

Discussed patient with Dr. Powers. Family meeting involved son Haider and Niece Radha. Reviewed 

patient's medical status and nearing criteria for discharge to SNF. Family is coming to terms with 

challenges in setting of patient's high fall risk and providing additional services to help with 

patient safety as in hiring helpers to sit with patient as 24 hour care givers. I will provide them 


with resources. I have placed a call to Sarah at [facility] as family is still wishing to pursue 

placement at this facility. CM to follow.



Plan: To SNF with additional caregivers as provided by family.

 

Date Signed:  07/30/2018 10:47 AM

Electronically Signed By:Willa Garcia RN

## 2018-07-30 NOTE — ASMTCMCOM
CM Note

 

CM Note                       

Notes:

This CM spoke with Veronica at [facility], they are denying patient admission at this time as they feel 

unable to meet the needs of the patient and family. I attempted to reach Florentino her son and left a 

message for he and his cousin to possible check into other facilities. Awaiting a return 

call. Referrals to [facility] as well as [facility]. Caroline Alfonso also aware of 

situation. CM to follow.



Plan: To SNF when medically ready for discharge.

 

Date Signed:  07/30/2018 01:31 PM

Electronically Signed By:Willa Garcia RN

## 2018-07-30 NOTE — HOSPPROG
Hospitalist Progress Note


Assessment/Plan: 


Ms Hendrix is a 69yo F with history of recent R parietal intracranial hemorrhage 

resulting in L sided paresis and L hemineglect who presents from [place] [facility] 

due to altered mental status. 





Encephalopathy with agitated delirium: Worsened this morning. Unclear 

precipitant. Will discontinue hydroxyzine (started over weekend) and add on PRN 

seroquel for AM in addition to scheduled seroquel at bedtime. Will continue to 

avoid centrally acting meds and partake in delirium precautions. Family 

updated. 





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 in 6/2018. Dense left hemiparesis 

with spasticity and hemineglect.  She has not progressed after 1 month of inpt 

rehab and is no longer a candidate for this.  Cont PT/OT, speech.  Discussed 

with neurology.  Should have outpt neurology appt for consideration of botox 

injection to treat spasticity component. 





HTN: BP controlled. Continue current regimen of amlodipine and metoprolol.





Hyperglycemia: Discontinued SSI (haven't used) and BG checks to limit delirium. 





Dysphagia: Now on regular diet per SLP. 





VTE ppx: high risk, LMWH


Diet: regular


Code: FCFT


Disposition: Remain inpatient for management of agitated delirium/acute 

encephalopathy. Not candidate for acute rehab and thus plan for SNF discharge 

when medically ready. Had family meeting 7/30 with son and niece and CM. 

Updated family on medical needs and transition to SNF. They are seeking out 

SNFs in the area and agreeable to this option.


Subjective: Had 2 falls off bed last night, no headstrike or LOC per RN. 

Agitated this morning. Complaining of pain in back of neck.


Objective: 


 Vital Signs











Temp Pulse Resp BP Pulse Ox


 


 36.8 C   78   18   115/82 H  91 L


 


 07/30/18 08:00  07/30/18 08:00  07/30/18 08:00  07/30/18 08:00  07/30/18 08:00








 











 07/29/18 07/30/18 07/31/18





 05:59 05:59 05:59


 


Intake Total 650 300 


 


Balance 650 300 














- Physical Exam


Constitutional: other (agitated, intermittently appears uncomfortable)


Eyes: PERRL, anicteric sclera, EOMI


Ears, Nose, Mouth, Throat: moist mucous membranes, hearing normal, ears appear 

normal, no oral mucosal ulcers


Cardiovascular: regular rate and rhythym, no murmur, rub, or gallop


Respiratory: no respiratory distress, no rales or rhonchi, clear to auscultation


Gastrointestinal: normoactive bowel sounds, soft, non-tender abdomen, no 

palpable masses


Skin: no rashes or abrasions, no fluctuance, no induration


Neurologic: other (alert but not oriented, L hemiparesis with spasticity, L 

hemineglect)


Psychiatric: other (agitated )





ICD10 Worksheet


Patient Problems: 


 Problems











Problem Status Onset


 


Altered mental status Acute  


 


Chest pain Acute  


 


Hemorrhagic cerebrovascular accident (CVA) Acute

## 2018-07-31 LAB — PLATELET # BLD: 412 10^3/UL (ref 150–400)

## 2018-07-31 RX ADMIN — ENOXAPARIN SODIUM SCH MG: 100 INJECTION SUBCUTANEOUS at 12:15

## 2018-07-31 RX ADMIN — Medication SCH APP: at 19:28

## 2018-07-31 RX ADMIN — METOPROLOL TARTRATE SCH MG: 50 TABLET, FILM COATED ORAL at 12:14

## 2018-07-31 RX ADMIN — METOPROLOL TARTRATE SCH MG: 50 TABLET, FILM COATED ORAL at 19:26

## 2018-07-31 RX ADMIN — Medication SCH MG: at 19:26

## 2018-07-31 RX ADMIN — Medication SCH APP: at 12:15

## 2018-07-31 RX ADMIN — VITAMIN D, TAB 1000IU (100/BT) SCH UNITS: 25 TAB at 12:14

## 2018-07-31 RX ADMIN — ANALGESIC BALM PRN APP: 1.74; 4.06 OINTMENT TOPICAL at 22:40

## 2018-07-31 RX ADMIN — PANTOPRAZOLE SODIUM SCH MG: 40 TABLET, DELAYED RELEASE ORAL at 12:13

## 2018-07-31 RX ADMIN — ACETAMINOPHEN PRN MG: 325 TABLET ORAL at 12:14

## 2018-07-31 RX ADMIN — BACLOFEN SCH MG: 10 TABLET ORAL at 12:14

## 2018-07-31 NOTE — HOSPPROG
Hospitalist Progress Note


Assessment/Plan: 


Ms Hendrix is a 69yo F with history of recent R parietal intracranial hemorrhage 

resulting in L sided paresis and L hemineglect who presents from [place] [facility] 

due to altered mental status. 





# Acute encephalopathy with agitated delirium: Worsened over last 2 days. 

Unclear precipitant. Discontinued centrally acting medications including 

baclofen for now. Will check CBC, BMP, TSH, UA. Continue seroquel 12.5mg daily 

PRN and QHS. Delirium precautions.





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 Dense left 

hemiparesis with spasticity and hemineglect.  She has not progressed after 1 

month of inpt rehab and is no longer a candidate for this.  Cont PT/OT, speech.

  Discussed with neurology.  Should have outpt neurology appt for consideration 

of botox injection to treat spasticity component. 





#HTN: BP controlled. Continue current regimen of amlodipine and metoprolol.





#Hyperglycemia: Discontinued SSI (haven't used) and BG checks to limit 

delirium. 





#Dysphagia: Now on regular diet per SLP. 





VTE ppx: high risk, LMWH


Diet: regular


Code: FCFT


Disposition: Remain inpatient for management of agitated delirium/acute 

encephalopathy. Not candidate for acute rehab and thus plan for SNF discharge 

when medically ready, hopefully within next day or so pending improvement in 

delirium. Had family meeting 7/30 with son and niece and CM. Updated family on 

medical needs and transition to SNF. They are seeking out SNFs in the area and 

agreeable to this option.


Subjective: Agitated yesterday afternoon requiring additional seroquel. Slept 

through the night and into morning however upon wakening continues to 

demonstrate agitation/irritability. She denies pain, nausea, dysuria, 

constipation.


Objective: 


 Vital Signs











Temp Pulse Resp BP Pulse Ox


 


 36.6 C   76   16   126/73 H  93 


 


 07/31/18 13:50  07/31/18 06:23  07/31/18 06:23  07/31/18 06:23  07/31/18 06:23








 











 07/30/18 07/31/18 08/01/18





 05:59 05:59 05:59


 


Intake Total 300 300 


 


Balance 300 300 














- Physical Exam


Constitutional: other (agitated but in no distress)


Eyes: PERRL, anicteric sclera, EOMI


Ears, Nose, Mouth, Throat: moist mucous membranes, hearing normal, ears appear 

normal, no oral mucosal ulcers


Cardiovascular: regular rate and rhythym, no murmur, rub, or gallop


Respiratory: no respiratory distress, no rales or rhonchi, clear to auscultation


Gastrointestinal: normoactive bowel sounds, soft, non-tender abdomen, no 

palpable masses


Genitourinary: no bladder fullness, no bladder tenderness, no renal bruits


Skin: no rashes or abrasions, no fluctuance, no induration


Neurologic: other (alert but not oriented, Left sided hemiparesis, mild left 

hemineglect)


Psychiatric: encephalopathic, other (intermittently agitated and climbing out 

of bed)





ICD10 Worksheet


Patient Problems: 


 Problems











Problem Status Onset


 


Altered mental status Acute  


 


Chest pain Acute  


 


Hemorrhagic cerebrovascular accident (CVA) Acute

## 2018-07-31 NOTE — ASMTCMCOM
CM Note

 

CM Note                       

Notes:

Patient reviewed in rounds. Agitation persists. She did sleep well. family remains guarded and 

fearful of safe dc to SNF. They are inquiring about another neurologist to consult. MD informed. I 

have spoken to them at length regarding their options for SNF and the uncertainty and 

unpredictability of presentation with a stroke patient. The patient remains profoundly spastic and 

has significant left sided neglect. She also exhibits emotional outbursts and disorganized 

thoughts.  I have explained that in lieu of taking her home and caring for her themselves they can 

arrange for sitters at what ever facility they may choose. They have expressed near disbelief at 

the impact of covering the cost of sitters. Over 45 minutes spent counseling family today. I called 


over to inpatient rehab where I was informed that she is not eligible to return to acute care due 

to lack of progress. I also spoke to Daxa Good the CM at inpatient who has worked extensively with 


the family and reports that the family has been informed of progress and need for SNF with added 

care paid for by family for a long period of time. Will see if the [facility] Co-ordinator might 


be able to visit with the family to help assist with understanding the complexities of her 

situation. 



Plan: To SNF when medically cleared for discharge.

 

Date Signed:  07/31/2018 04:47 PM

Electronically Signed By:Willa Garcia RN

## 2018-08-01 RX ADMIN — PANTOPRAZOLE SODIUM SCH MG: 40 TABLET, DELAYED RELEASE ORAL at 08:58

## 2018-08-01 RX ADMIN — Medication SCH APP: at 20:03

## 2018-08-01 RX ADMIN — METOPROLOL TARTRATE SCH MG: 50 TABLET, FILM COATED ORAL at 19:59

## 2018-08-01 RX ADMIN — VITAMIN D, TAB 1000IU (100/BT) SCH UNITS: 25 TAB at 08:58

## 2018-08-01 RX ADMIN — Medication SCH MG: at 19:59

## 2018-08-01 RX ADMIN — METOPROLOL TARTRATE SCH MG: 50 TABLET, FILM COATED ORAL at 08:58

## 2018-08-01 RX ADMIN — Medication SCH APP: at 11:50

## 2018-08-01 RX ADMIN — ENOXAPARIN SODIUM SCH MG: 100 INJECTION SUBCUTANEOUS at 08:58

## 2018-08-01 NOTE — HOSPPROG
Hospitalist Progress Note


Assessment/Plan: 


Ms Hendrix is a 69yo F with history of recent R parietal intracranial hemorrhage 

resulting in L sided paresis and L hemineglect who presents from [facility] 

due to altered mental status. 





# Acute encephalopathy with agitated delirium: Improving. Unclear precipitant: 

suspect environmental in setting of CVA. Worsened over last 2 days. Avoiding 

centrally acting medications including baclofen and especially benzodiazepines 

for now. Infectious work up per below. Continue seroquel 12.5mg daily PRN and 

QHS. Delirium precautions.





#Leukocytosis: New, unclear etiology. UA, CXR without source. No localizing 

source on exam. Follow blood cultures, no antibiotics for now.





#R parietal hemorrhagic CVA: Craniotomy at [facility] in 6/2018. Dense left 

hemiparesis with spasticity and hemineglect.  She has not progressed after 1 

month of inpt rehab and is no longer a candidate for this.  Cont PT/OT, speech.

  Discussed with neurology.  Should have outpt neurology appt for consideration 

of botox injection to treat spasticity component. I do not think that having 

another neurologist see her would be indicated at this time. 





#HTN: BP controlled. Continue current regimen of amlodipine and metoprolol.





#Hyperglycemia: Discontinued SSI (haven't used) and BG checks to limit 

delirium. 





#Dysphagia: Now on regular diet per SLP. 





VTE ppx: high risk, LMWH


Diet: regular


Code: FCFT


Disposition: Remain inpatient for management of agitated delirium/acute 

encephalopathy although if she continues to remain stable/improving I believe 

she will be medically ready for discharge tomorrow (8/2). She has been accepted 

to [facility]. She is not a candidate for acute rehab.


Subjective: Remained agitated overnight however seems improved in morning. She 

is calm and answering most questions appropriately. She denies pain, nausea, 

constipation, fevers.


Objective: 


 Vital Signs











Temp Pulse Resp BP Pulse Ox


 


 36.8 C   80   14   113/65   93 


 


 08/01/18 11:40  08/01/18 11:40  08/01/18 11:40  08/01/18 11:40  08/01/18 11:40








 Laboratory Results





 07/31/18 15:45 





 07/31/18 15:45 





 











 07/31/18 08/01/18 08/02/18





 05:59 05:59 05:59


 


Intake Total 300 1050 


 


Balance 300 1050 














- Physical Exam


Constitutional: no apparent distress, not in pain


Eyes: PERRL, anicteric sclera, EOMI


Ears, Nose, Mouth, Throat: moist mucous membranes, hearing normal, ears appear 

normal, no oral mucosal ulcers


Cardiovascular: regular rate and rhythym, no murmur, rub, or gallop


Respiratory: no respiratory distress, no rales or rhonchi


Gastrointestinal: normoactive bowel sounds, soft, non-tender abdomen, no 

palpable masses


Genitourinary: no bladder fullness, no bladder tenderness, no renal bruits


Skin: no rashes or abrasions, no fluctuance, no induration


Musculoskeletal: full muscle strength, no muscle tenderness, normal joint ROM


Neurologic: other (alert and oriented x 1-2 (knew her name, knew that she was 

in [facility], and [place]), L dense hemiparesis, mild L hemineglect)


Psychiatric: encephalopathic





ICD10 Worksheet


Patient Problems: 


 Problems











Problem Status Onset


 


Altered mental status Acute  


 


Chest pain Acute  


 


Hemorrhagic cerebrovascular accident (CVA) Acute

## 2018-08-01 NOTE — ASMTCMCOM
CM Note

 

CM Note                       

Notes:

8/1/2018 Case Management Note



Met w/pt son Florentino.  Florentino is visiting from the [place] area.  He can be reached at 

[phone].  Florentino in agreement with need for [facility].  Florentino requested a new neuro 

consult. Case Management informed Florentino that request needs to be discussed with MD by the family.



Pt son Aroldo lives locally and can be reached at [phone].



Florentino requested RN call him at d/c before pt is transferred to [facility].



Case Management d/c poc:  to [facility]



Case Management to follow.

 

Date Signed:  08/01/2018 01:55 PM

Electronically Signed By:Ny Holcomb RN

## 2018-08-02 VITALS — DIASTOLIC BLOOD PRESSURE: 65 MMHG | SYSTOLIC BLOOD PRESSURE: 118 MMHG

## 2018-08-02 RX ADMIN — PANTOPRAZOLE SODIUM SCH MG: 40 TABLET, DELAYED RELEASE ORAL at 11:09

## 2018-08-02 RX ADMIN — VITAMIN D, TAB 1000IU (100/BT) SCH UNITS: 25 TAB at 11:08

## 2018-08-02 RX ADMIN — ENOXAPARIN SODIUM SCH MG: 100 INJECTION SUBCUTANEOUS at 11:08

## 2018-08-02 RX ADMIN — METOPROLOL TARTRATE SCH MG: 50 TABLET, FILM COATED ORAL at 11:08

## 2018-08-02 RX ADMIN — Medication SCH APP: at 11:09

## 2018-08-02 NOTE — PDDCSUM
Discharge Summary


Discharge Summary: 


Date of Admission: 7/24/2018


Date of Discharge: 8/2/2018





Consultants: neurology





Procedures/Studies: CTA chest - negative for PE or aortic pathology; BLE venous 

duplex US - negative for DVT





Brief Hospital Course by Diagnosis:


Ms Hendrix is a 67yo F with history of recent R parietal intracranial hemorrhage 

resulting in L sided paresis and L hemineglect who presents from [facility] 

due to altered mental status. 





# Acute encephalopathy with agitated delirium: Suspect related to baclofen 

uptitration and environmental changes. She waxed and waned throughout admission 

but had been stable without sitter for >24 hours prior to discharge. We 

discontinued baclofen and advise avoiding other centrally acting medications as 

able as she seems to be easily susceptible to delirium. We did add seroquel 

12.5mg QHS as well as during the day PRN which seems to help. 





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 Dense left 

hemiparesis with spasticity and hemineglect. CT head here was stable. She has 

not progressed after 1 month of inpatient rehab and is no longer a candidate 

for this. She continued to work with PT/OT/SLP while inpatient and was 

discharged to [facility]. Neurology was consulted: discussed prognosis and 

low likelihood of further recovery. No indication for antiplatelet agent.  





#Leukocytosis: Unclear etiology. UA, CXR, BCx without source. No localizing 

source on exam. 





#HTN: BP controlled with amlodipine and metoprolol.





#Hyperglycemia: BG consistently <180 without intervention. No medications at 

discharge for this.





#Dysphagia: SLP recommended dysphagia 3 diet with thin liquids. 





#Tobacco use





Follow Up Plan/Items for Follow Up:


1. Outpatient neurology appt for consideration of botox injection to treat 

spasticity


2. Consider adding back low dose (5mg) baclofen. Patient seemed to be very 

susceptible to delirium upon adding back centrally acting medications.


3. Monitor and titrate BP meds





Tests Pending at Discharge: finalized blood cultures





Medications at Discharge: Please refer to EMR for complete medication list. 





Physical Exam: Vitals reviewed and patient examined on day of discharge. She 

was alert and interacting appropriately with occasional confusion. She had 

normal cardiac and pulmonary exams. She had dense left sided hemiparesis with 

left hemineglect.

## 2018-08-02 NOTE — ASDISCHSUM
----------------------------------------------

Discharge Information

----------------------------------------------

Plan Status:SNF                                      Medically Cleared to Leave:07/31/2018

Discharge Date:07/31/2018                            CM D/C Disposition:Skilled Nursing Facility

ADT D/C Disposition:Skilled Nursing Facility         Projected Discharge Date:08/02/2018 11:00 AM

Transportation at D/C:ALS/BLS                        Discharge Delay Reason:

Follow-Up Date:08/02/2018 11:00 AM                   Discharge Slot:

Final Diagnosis:

----------------------------------------------

Placement Information

----------------------------------------------

Referral Type:Acute Care                             Referral ID:[id number]

Provider Name:

Address 1:                                           Phone Number:

Address 2:                                           Fax Number:

City:                                                Selection Factors:

State:

 

Referral Type:Rehabilitation Hospital                Referral ID:[id number]

Provider Name:

Address 1:                                           Phone Number:

Address 2:                                           Fax Number:

City:                                                Selection Factors:

State:

 

Referral Type:*Nursing Home/SNF                      Referral ID:[id number]

Provider Name:[facility]

Address 1:[address]                    Phone [phone]

Address 2:                                           Fax [phone]

City:[place]                                      Selection Factors:

State:[place]

 

Referral Type:[facility]          Referral ID:[id number]

Provider Name:

Address 1:                                           Phone Number:

Address 2:                                           Fax Number:

City:                                                Selection Factors:

State:

 

Referral Type:Palliative Care                        Referral ID:[id number]

Provider Name:[facility]

Address 1:[address]                            Phone Number:

Address 2:                                           Fax Number:

City:[place]                                            Selection Factors:

State:[place]

 

----------------------------------------------

Patient Contact Information

----------------------------------------------

Contact Name:DESIREE                             Relationship:

Address:[address]                          Home [phone]

                                                     Work Phone:

[facility]:[place]                                        Alternate Phone:

State/Zip Code:[place] [address]                              Email:

----------------------------------------------

Financial Information

----------------------------------------------

Financial Class:Medicare

Primary Plan Desc:MEDICARE INPATIENT                 Primary Plan Number:[id number]

Secondary Plan Desc:[employer] INSURANCE             Secondary Plan Number:[id number]

 

 

----------------------------------------------

Assessment Information

----------------------------------------------

----------------------------------------------

[facility] Initial CM Assessment

----------------------------------------------

Living Arrangements

 

What is your living           Answers:  With Spouse                           

arrangement? Who do you                                                       

live with?                                                                    

Type Of Residence

 

What kind of residence do     Answers:  House                                 

you live in?                                                                  

Discharge Plan Comments

 

Coordination Status           

Comments                      

Notes:

Pt is a 69 y/o female admitted for ams, recent cva and chest pain. Pt was recently at [facility] inpatient 


rehab from 7/5/18 to 7/24/18. Therapies have been ordered and awaiting recommendations. Pt will 

most likely d/c back to [facility] inpatient rehab, if appropriate. CM spoke to pts son Aroldo 

(P# [phone]) and provided updates. CM to follow.







Plan: TBD

 

Date Signed:  07/25/2018 11:55 AM

Electronically Signed By:TORITO Smith

 

 

----------------------------------------------

[facility] VICTOR M Progress Note

----------------------------------------------

CM Note

 

CM Note                       

Notes:

CM spoke w/ pts family regarding d/c POC per their request. PT is recommending inpatient rehab. VICTOR M 

spoke to Lima from inpatient rehab and they had a team discussion today and thought that it 

would not be appropriate for pt to return. Lima reports that pt is unable to do 3 hours of 

therapies a day. The recommendation from inpatient rehab is for pt to transition to a SNF. VICTOR M spoke 


to Dr. Cushing. Dr. Cushing would like pt to get into a stroke rehab program. Referral sent to 

inpatient rehab facilities. Family would like another referral made to [facility] in [place]. CM to 

follow.







Plan: TBD

 

Date Signed:  07/25/2018 02:52 PM

Electronically Signed By:TORITO Smith

 

 

----------------------------------------------

[facility] VICTOR M Progress Note

----------------------------------------------

CM Note

 

VICTOR M Note                       

Notes:

CM spoke to Josephine Silva and Sakina regarding d/c POC. CM discussed w/ them the different levels of 

care. Shira and Josephine had questions about insurance coverage. CM provided them w/ phone number for 

financial counseling. CM contacted Cat Lai w/ financial counseling and pt has Medicare part A 

and B coverage. Cat will follow up with patient on a secondary insurance. Julia from [facility] came 

and met w/ them today. [facility] has accepted pt. [facility] denied pt. CM left a msg for 

Daxa, the  from inpatient rehab. CM to follow.







Plan: [facility] SNF

 

Date Signed:  07/26/2018 03:25 PM

Electronically Signed By:TORITO Smith

 

 

----------------------------------------------

[facility] Progress Note

----------------------------------------------

CM Note

 

CM Note                       

Notes:

Pts case discussed in tx rounds. CM met w/ pts family to discuss d/c plans. Pts family went over to 


[facility] for a care conference. Family has significant concerns about pts safety and potential for 

falling out of bed and chair. Pt has been off a roll belt and sitter since yesterday. 







 CM spoke to Marely charge nurse,  Dr. Cushing and Nasreen manager of  about this 

case. Nasreen will call Sakina, the niece. Potential care conference on Monday 

w/ MD, neurology, RN, and Yudelka Dumont from [facility] will need to speak to her DON and  to discuss 

if they can still accept pt. [facility] is recommending pt has 24hr supervision until pts confusion 

clears. CM suggested that family takes turn w/ keeping pt company. CM provided family w/ list of 

non skilled HC for a private duty caregiver. CM notified [employer] that pt would benefit from a 

Medicaid evaluation for HCBS services. Cat Lai from financial counseling met w/ pt today. Cat 

will be inquiring about a secondary insurance. Pt reported to Cat that she has [employer] as her 

secondary. CM provided Cat w/ phone number for Sakina to follow up. Pts son from [place] will be 

here on Sunday. CM spoke to Daxa  at inpatient rehab. CM to follow.



 



Plan: TBD

 

Date Signed:  07/27/2018 03:22 PM

Electronically Signed By:TORITO Smith

 

 

----------------------------------------------

[facility] VICTOR M Progress Note

----------------------------------------------

VICTOR M Note

 

VICTOR M Note                       

Notes:

I spoke with patient's niece at length this afternoon.  After meeting with [facility] in [place] 

today, family is now leaning towards choosing a facility that will accommodate LTC - as they 

believe she will eventually need to 'live' somewhere.  I recommended both [facility] and 


[facility], princess states they will check into both facilities.  Princess has also been in touch with a 


Medicaid specialist about getting her aunt on Medicaid.  CM has also sent a referral to 

[facility], they will follow-up and screen for Medicaid on Monday.  The family is also exploring ways 

in which they can either help provided 1:1 care at the SNF or hire private care, (they are 

considering a Go Fund Me page) as they are quite concerned about patient's safety, rolling out of 

bed, etc.



I explained that patient is medically ready for d/c and encouraged family to tour facilities and 

follow-up with VICTOR M asap.  Niece states patient's other son will be flying into town this weekend and 


will be driving this plan.  Referrals have yet to be sent to the two facilities above, will wait to 


speak with family about choice.  A care conference may be necessary on Monday, pending events this 

weekend.  We will also need to notify [facility] in [place] of this potential change in plans.  Princess 

has my # and can reach out to me at any time with questions.  CM will follow-up this weekend.



Plan:  SNF

 

 

Date Signed:  07/27/2018 08:34 PM

Electronically Signed By:Nasreen Jenkins RN

 

 

----------------------------------------------

[facility] Progress Note

----------------------------------------------

CM Note

 

CM Note                       

Notes:

CM met with Pt and family.  Multiple questions re insurance and what services can be paid for.  CM 

recommended that they have a family mtg with physician on Monday with the focus on what services 

might be recommended once Pt is [facility] and ready for D/C.  There are many family members present with 

many different thoughts and ideas, with one of the family members becoming upset easily; the 

environment can become quite chaotic and appears to distress Pt.  Family was told family mtg should 


be limited to Pt and 1-2 people so that the environment is more conducive to decision 

making.  [facility] has accepted Pt.  Nijonathan reported [facility] care indicated Pt was not making enough 

progress to return.  CM left a message for Lima in order to rule out [facility] as a discharge 

option.  CM to follow.

 

D/C:  TBD

 

 

Date Signed:  07/28/2018 01:17 PM

Electronically Signed By:Inez Portillo

 

 

----------------------------------------------

[facility] VICTOR M Progress Note

----------------------------------------------

CM Note

 

CM Note                       

Notes:

Met with son from [place] who expressed frustration and concern over plan of care for his 

mother. He would like to participate in a family meeting to ascertain plan of care from this point 

forward. He would be available in the morning about 10 am. I have attempted to see if Dr. Powers 

might be able to make this time. CM to follow.

 

Date Signed:  07/29/2018 04:11 PM

Electronically Signed By:Willa Garcia RN

 

 

----------------------------------------------

[facility] Progress Note

----------------------------------------------

CM Note

 

CM Note                       

Notes:

Discussed patient with Dr. Powers. Family meeting involved son Haider and Niece Radha. Reviewed 

patient's medical status and nearing criteria for discharge to SNF. Family is coming to terms with 

challenges in setting of patient's high fall risk and providing additional services to help with 

patient safety as in hiring helpers to sit with patient as 24 hour care givers. I will provide them 


with resources. I have placed a call to Sarah at [facility] as family is still wishing to pursue 

placement at this facility. CM to follow.



Plan: To SNF with additional caregivers as provided by family.

 

Date Signed:  07/30/2018 10:47 AM

Electronically Signed By:Willa Garcia RN

 

 

----------------------------------------------

[facility] Progress Note

----------------------------------------------

CM Note

 

CM Note                       

Notes:

This CM spoke with Veronica at [facility], they are denying patient admission at this time as they feel 

unable to meet the needs of the patient and family. I attempted to reach Florentino her son and left a 

message for he and his cousin to possible check into other facilities. Awaiting a return 

call. Referrals to [facility] as well as [facility]. Caroline Alfonso also aware of 

situation. CM to follow.



Plan: To SNF when medically ready for discharge.

 

Date Signed:  07/30/2018 01:31 PM

Electronically Signed By:Willa Garcia RN

 

 

----------------------------------------------

[facility] Progress Note

----------------------------------------------

CM Note

 

CM Note                       

Notes:

Patient reviewed in rounds. Agitation persists. She did sleep well. family remains guarded and 

fearful of safe dc to SNF. They are inquiring about another neurologist to consult. MD informed. I 

have spoken to them at length regarding their options for SNF and the uncertainty and 

unpredictability of presentation with a stroke patient. The patient remains profoundly spastic and 

has significant left sided neglect. She also exhibits emotional outbursts and disorganized 

thoughts.  I have explained that in lieu of taking her home and caring for her themselves they can 

arrange for sitters at what ever facility they may choose. They have expressed near disbelief at 

the impact of covering the cost of sitters. Over 45 minutes spent counseling family today. I called 


over to inpatient rehab where I was informed that she is not eligible to return to acute care due 

to lack of progress. I also spoke to Daxa Good the CM at inpatient who has worked extensively with 


the family and reports that the family has been informed of progress and need for SNF with added 

care paid for by family for a long period of time. Will see if the [facility] Program Co-ordinator might 


be able to visit with the family to help assist with understanding the complexities of her 

situation. 



Plan: To SNF when medically cleared for discharge.

 

Date Signed:  07/31/2018 04:47 PM

Electronically Signed By:Willa Garcia RN

 

 

----------------------------------------------

[facility] Progress Note

----------------------------------------------

CM Note

 

CM Note                       

Notes:

8/1/2018 Case Management Note



Met w/pt son Florentino.  Florentino is visiting from [facility].  He can be reached at 

[phone].  Florentino in agreement with need for [facility].  Florentino requested a new neuro 

consult. Case Management informed Florentino that request needs to be discussed with MD by the family.



Pt son Aroldo lives locally and can be reached at [phone].



Florentino requested RN call him at d/c before pt is transferred to [facility].



Case Management [facility]  [facility]



Case Management to follow.

 

Date Signed:  08/01/2018 01:55 PM

Electronically Signed By:Ny Holcomb RN

 

 

----------------------------------------------

Case Management Discharge Plan Note

----------------------------------------------

Case Management Discharge

 

Discharge Order Complete?     Answers:  Yes                                   

Patient to Obtain             Answers:  Other                         Notes:  [facility] rehab

Medications                                                                   

Transportation Arranged       Answers:  [facility]                         

Transport will Pick (Date     08/02/2018 01:00 PM

& Time)                       

Case Management Transport     Answers:  Yes                                   

Form Complete                                                                 

Faxed Final Orders            Answers:  Yes                                   

Agency/Facility Transfer      Answers:  Yes                                   

Report Printed & Faxed to                                                     

Receiving Agency                                                              

Family Notified               Answers:  Yes                           Notes:  Princess Presley in 

                                                                              room.  Phone call to juancho Good and Aroldo

Discharge Comments            

Notes:

8/2/2018 Case Management Note



Phone calls to Aroldo and Florentino alerting to discharge plan.  Princess Presley in room, discussed discharge 


plan as well.  Notified family that [facility] is requiring family provide 24/7 care for 

safety.  Family aware that [facility] will require a written agreement once they arrive at the 

facility.  Arranged transport with [facility] stretcher d/t pt left sided neglect and 

impulsiveness.  Documented on PCS form pt unsafe to transport in wheelchair.  Informed family there 


may be a charge if Medicare does not cover transport costs.



Faxed final orders to [facility].  RN to call report.



Case management d/c poc:  to [facility] rehab.

 

Date Signed:  08/02/2018 12:09 PM

Electronically Signed By:Ny Holcomb RN

 

 

----------------------------------------------

[facility] CM Progress Note

----------------------------------------------

CM Note

 

CM Note                       

Notes:

8/2/2018 Case Management Note



Palliative outpatient referral sent to [facility].  Shahrzad Vazquez accepted pt on the phone and will 

contact constantino Good to arrange intake meeting at [facility].

 

 

Date Signed:  08/02/2018 12:25 PM

Electronically Signed By:Ny Holcomb RN

 

 

----------------------------------------------

Intervention Information

----------------------------------------------

Intervention Type:*IM-Signed                         Date of Service:08/02/2018 12:13 PM

Patient Type:Inpatient                               Staff Member:Rachel Chandler

Hours:                                               Discipline:

Severity:                                            Comment:

## 2018-08-02 NOTE — PDIAF
- Diagnosis


Code Status: Full Code





- Medication Management


Discharge Medications: 


 Medications to Continue on Transfer





Cholecalciferol Vit D3 [Vitamin D3 (*)] 1,000 units PO DAILY 07/05/18 [Last 

Taken 07/24/18 08:40]


Enoxaparin [Lovenox 40 MG (*)] 40 mg SQ DAILY 07/05/18 [Last Taken 07/24/18 08:

40]


Metoprolol Tartrate [Lopressor 50 mg (*)] 50 mg PO BID 07/05/18 [Last Taken 07/ 24/18 08:40]


Pantoprazole Sodium [Protonix 40mg (*)] 40 mg PO DAILY 07/05/18 [Last Taken 07/ 24/18 08:40]


amLODIPine BESYLATE [Norvasc 5 mg (*)] 5 mg PO DAILY 07/05/18 [Last Taken 07/24/ 18 08:40]


Acetaminophen [Tylenol  mg (*)] 1,000 mg PO Q8 07/24/18 [Last Taken 07/24/ 18 15:00]


Bisacodyl [Dulcolax] 10 mg RC DAILY PRN 07/24/18 [Last Taken Unknown]


Citalopram [CeleXA 20 MG] 20 mg PO DAILY 07/24/18 [Last Taken Unknown]


Methyl Salicylate/Menthol [Bengay (*)] 1 chio TP Q6 PRN 07/24/18 [Last Taken 07/

15/18]


Mineral Oil/Pet Hy-Phl [Aquaphor Ointment (*)] 1 chio TP BID 07/24/18 [Last 

Taken 07/24/18 08:40]


Sennosides [Senokot] 1 each PO BID PRN 07/24/18 [Last Taken Unknown]


Tears/Dextran 70/Hypromellose [Natural Balance Tears (*)] 1 drop EACHEYE Q2 PRN 

07/24/18 [Last Taken 07/19/18 14:40]


QUEtiapine FUMARATE [Seroquel 25 mg (*)] 12.5 mg PO DAILY PRN  tab 08/02/18 [

Last Taken Unknown]


QUEtiapine FUMARATE [Seroquel 25 mg (*)] 12.5 mg PO HS  tab 08/02/18 [Last 

Taken Unknown]








Discharge Medications: Refer to the Discharge Home Medication list for PRN 

reason.





- Orders


Services needed: Physical Therapy, Occupational Therapy, Speech Language 

Pathologist


Diet Texture: Dysphagia 3 - Advanced - Moist, Bite-Size, Thin Liquids, Meds 

Whole w/Liquids


Additional Instructions: 


1. We have started you on seroquel (quetiapine) to help keep you calm at night 

and get restful sleep. You can also get this during the day on an as needed 

basis. 


2. We have also discontinued your baclofen and other medications that may cause 

agitation. 





- Follow Up Care


Current Providers and Referrals: 


ALONA PAUL [Primary Care Provider] - As per Instructions

## 2018-08-02 NOTE — ASMTLACE
LACE

 

Length of stay for            Answers:  7-13 days                             

current admission                                                             

Acuity / Level of             Answers:  Yes                                   

Care: Did the patient                                                         

have an inpatient                                                             

admission?                                                                    

Comorbidities - select        Answers:  Cerebrovascular disease               

all that apply                          (CVA, TIA, aneurysms, vasc            

                                        ular dementia)                        

                                        Other                         Notes:  HTN

# of Emergency department     Answers:  1-2                                   

visits in the last 6                                                          

months                                                                        

Social determinants           Answers:  Mental health diagnosis               

                                        (anxiety, depression, pers            

                                        onality disorders, etc.)              

Score: 14

 

Date Signed:  08/02/2018 12:26 PM

Electronically Signed By:Ny Holcomb RN

## 2018-08-02 NOTE — ASMTCMCOM
CM Note

 

CM Note                       

Notes:

8/2/2018 Case Management Note



Palliative outpatient referral sent to [facility].  Shahrzad Vazquez accepted pt on the phone and will 

contact constantino Good to arrange intake meeting at [facility].

 

 

Date Signed:  08/02/2018 12:25 PM

Electronically Signed By:Ny Holcomb RN

## 2018-08-02 NOTE — ASMTDCNOTE
Case Management Discharge

 

Discharge Order Complete?     Answers:  Yes                                   

Patient to Obtain             Answers:  Other                         Notes:  [facility]

Medications                                                                   

Transportation Arranged       Answers:  [facility]                         

Transport will Pick (Date     08/02/2018 01:00 PM

& Time)                       

Case Management Transport     Answers:  Yes                                   

Form Complete                                                                 

Faxed Final Orders            Answers:  Yes                                   

Agency/Facility Transfer      Answers:  Yes                                   

Report Printed & Faxed to                                                     

Receiving Agency                                                              

Family Notified               Answers:  Yes                           Notes:  Princess Presley in 

                                                                              room.  Phone call to juancho Good and Aroldo

Discharge Comments            

Notes:

8/2/2018 Case Management Note



Phone calls to Yanira alerting to discharge plan.  Princess Presley in room, discussed discharge 


plan as well.  Notified family that [facility] is requiring family provide 24/7 care for 

safety.  Family aware that [facility] will require a written agreement once they arrive at the 

facility.  Arranged transport with [facility] stretcher d/t pt left sided neglect and 

impulsiveness.  Documented on PCS form pt unsafe to transport in wheelchair.  Informed family there 


may be a charge if Medicare does not cover transport costs.



Faxed final orders to siddharth.  RN to call report.



Case management d/c poc:  to [facility] rehab.

 

Date Signed:  08/02/2018 12:09 PM

Electronically Signed By:Ny Holcomb RN